# Patient Record
Sex: FEMALE | Race: WHITE
[De-identification: names, ages, dates, MRNs, and addresses within clinical notes are randomized per-mention and may not be internally consistent; named-entity substitution may affect disease eponyms.]

---

## 2018-02-09 ENCOUNTER — HOSPITAL ENCOUNTER (EMERGENCY)
Dept: HOSPITAL 80 - FED | Age: 23
LOS: 1 days | Discharge: HOME | End: 2018-02-10
Payer: COMMERCIAL

## 2018-02-09 VITALS — TEMPERATURE: 97.9 F

## 2018-02-09 DIAGNOSIS — R11.2: Primary | ICD-10-CM

## 2018-02-09 LAB — PLATELET # BLD: 360 10^3/UL (ref 150–400)

## 2018-02-09 NOTE — EDPHY
H & P


Smoking Status: Never smoked


Time Seen by Provider: 02/09/18 22:34


HPI/ROS: 





CHIEF COMPLAINT:  Nausea, vomiting, chest pain





HISTORY OF PRESENT ILLNESS:  22-year-old female presents to the emergency 

department with nausea vomiting and anterior chest pain.  She states the pain 

in her chest began yesterday when she was "just sitting there ".  It is been 

intermittent.  She was able to work yesterday.  She states today the pain 

became worse and she began vomiting.  She has vomited at least 6 times.  No 

back pain. No real chest pain but does feel chest tightness.  Really no 

abdominal pain. No fevers or chills. She works as a CNA at a nursing home.  She 

says multiple coworkers are also sick.  Denies headache.  Denies any reported 

trauma.  She did get a flu shot this year.





REVIEW OF SYSTEMS:


Constitutional:  No fever, no chills.


Eyes:  No double or blurry vision.


ENT:  No sore throat.


Respiratory:  No cough, no shortness of breath.


Cardiac:  Chest pain as above


Gastrointestinal:  Vomiting. No diarrhea or abdominal.


Genitourinary:  No dysuria.


Musculoskeletal:  No neck or back pain.


Skin:  No rashes.


Neurological:  No headache. (Deyis Campos)


Past Medical/Surgical History: 





IUD (Deysi Campos M)


Social History: 





Single (Deysi Campos)


Physical Exam: 





General Appearance:  Alert, mild to moderate distress. 98% on room air.  Vital 

signs are stable. Vomiting.


Eyes:  Pupils equal and round.  Extraocular motions are all intact.


ENT:  Mouth:  Mucous membranes moist.


Respiratory:  No wheezing, rhonchi, or rales, lungs are clear to auscultation. 

On a to reproduce pain with palpation to the anterior aspect of the chest.  No 

palpable crepitus or other bony abnormality.


Cardiovascular:  Regular rate and rhythm.


Gastrointestinal:  Abdomen is soft and nontender, no masses, no rebound or 

guarding, bowel sounds normal.


Neurological:  Alert and oriented x 3, cranial nerves II through XII grossly 

intact


Skin:  Warm and dry, no rashes.


Musculoskeletal:  Nontender to palpate along the cervical, thoracic or lumbar 

spine.  Neck is supple.


Extremities:  Full range of motion and no peripheral edema.


Psychiatric:  Patient is oriented X 3, there is no agitation. (Rosin,Deysi M)


Constitutional: 


 Initial Vital Signs











Temperature (C)  36.8 C   02/09/18 22:25


 


Heart Rate  71   02/09/18 22:25


 


Respiratory Rate  18   02/09/18 22:25


 


Blood Pressure  132/92 H  02/09/18 22:25


 


O2 Sat (%)  98   02/09/18 22:25








 











O2 Delivery Mode               Room Air














Allergies/Adverse Reactions: 


 





No Known Allergies Allergy (Unverified 02/09/18 22:25)


 








Home Medications: 














 Medication  Instructions  Recorded


 


Levora-28 Tablet  02/09/18


 


Zoloft 25mg (*)  02/09/18














Medical Decision Making





- Diagnostics


Imaging: I viewed and interpreted images myself





- Diagnostics


Imaging Results: 


 Imaging Impressions





Chest X-Ray  02/10/18 00:09


IMPRESSION: Normal chest x-ray.











ED Course/Re-evaluation: 





22-year-old female presents to the emergency department with multiple episodes 

of nausea vomiting chest pain. Patient's abdomen is completely benign.  She has 

no pain with palpation. I doubt acute appendicitis.  I do not think imaging 

studies are indicated at this time of the abdomen and pelvis.





The patient is having anterior chest pain. She does not abuse drugs or alcohol.

  She does not smoke cigarettes.  Chest x-ray was ordered and reveals no 

evidence of pneumomediastinum, pneumothorax or pneumonia.





Patient required IV Zofran and ultimately IV Phenergan for her nausea and 

vomiting.  She received IV normal saline.  She was feeling better.





Case was discussed with Dr. Rick Coker, secondary supervising physician, who 

will assume care of this patient at 2:30 a.m. for disposition and plan. (Deysi Campos)


Differential Diagnosis: 





Including but not limited to gastroenteritis, dehydration, electrolyte 

abnormality, pneumomediastinum, pneumonia, acute appendicitis, intrauterine 

pregnancy, substance abuse (Deysi Campos)





- Data Points


Laboratory Results: 


 Laboratory Results





 02/09/18 22:55 





 02/09/18 22:55 








Medications Given: 


 








Discontinued Medications





Sodium Chloride (Ns)  1,000 mls @ 0 mls/hr IV ONCE ONE


   PRN Reason: Wide Open


   Stop: 02/09/18 22:58


   Last Admin: 02/09/18 23:01 Dose:  1,000 mls


Sodium Chloride (Ns)  1,000 mls @ 0 mls/hr IV ONCE ONE


   PRN Reason: Wide Open


   Stop: 02/10/18 02:05


   Last Admin: 02/10/18 02:05 Dose:  1,000 mls


Ondansetron HCl (Zofran)  4 mg IVP EDNOW ONE


   Stop: 02/09/18 22:58


   Last Admin: 02/09/18 23:00 Dose:  4 mg


Ondansetron HCl (Zofran)  4 mg IVP EDNOW ONE


   Stop: 02/10/18 00:16


   Last Admin: 02/10/18 00:16 Dose:  4 mg


Ondansetron HCl (Zofran Odt 4 Mg Prepack#2)  1 btl TAKEHOME EDNOW ONE


   Stop: 02/10/18 03:24


   Last Admin: 02/10/18 03:24 Dose:  1 btl


Promethazine HCl (Phenergan)  12.5 mg IVP EDNOW ONE


   Stop: 02/10/18 01:59


   Last Admin: 02/10/18 02:01 Dose:  12.5 mg








Departure





- Departure


Disposition: Home, Routine, Self-Care


Clinical Impression: 


Nausea & vomiting


Qualifiers:


 Vomiting type: unspecified Vomiting Intractability: non-intractable Qualified 

Code(s): R11.2 - Nausea with vomiting, unspecified





Condition: Good


Instructions:  Ondansetron (By mouth), Acute Nausea and Vomiting (ED)


Additional Instructions: 


You may take ondansetron every 8 hr as needed for nausea vomiting.


Return to the emergency depart for increasing chest pain, shortness of breath, 

fevers, chills, uncontrolled nausea vomiting, abdominal pain or any other 

concerns.

## 2018-02-10 VITALS
OXYGEN SATURATION: 96 % | SYSTOLIC BLOOD PRESSURE: 102 MMHG | DIASTOLIC BLOOD PRESSURE: 75 MMHG | RESPIRATION RATE: 16 BRPM | HEART RATE: 74 BPM